# Patient Record
Sex: FEMALE | Race: WHITE
[De-identification: names, ages, dates, MRNs, and addresses within clinical notes are randomized per-mention and may not be internally consistent; named-entity substitution may affect disease eponyms.]

---

## 2020-07-27 ENCOUNTER — HOSPITAL ENCOUNTER (OUTPATIENT)
Dept: HOSPITAL 39 - MAMMO | Age: 60
End: 2020-07-27
Payer: COMMERCIAL

## 2020-07-27 DIAGNOSIS — R92.2: Primary | ICD-10-CM

## 2020-07-27 PROCEDURE — 76641 ULTRASOUND BREAST COMPLETE: CPT

## 2020-07-27 PROCEDURE — 77065 DX MAMMO INCL CAD UNI: CPT

## 2020-07-28 NOTE — US
EXAM DESCRIPTION: 

3D Diagnostic, Left (accession [id number]), Breast,Left

(accession [id number]): Ultrasound



CLINICAL HISTORY: 

60 yearsFemaleABNORMAL MAMMOGRAM left breast retroareolar focal

asymmetry. Previous screening studies at outside imaging

facility. No personal or family history of breast cancer.

Menarche age 14. Childbirth age 18. Menopause age unknown.

Currently on HRT Lifetime risk of developing breast cancer

(Tyrer-Cuzick model)(%):  4.9.



COMPARISON: 

2-D digital screening bilateral mammography October 2009. More

recent screening examinations at outside imaging facility.



TECHNIQUE: 

Left breast LM, CC, and MLO projection full-field images, digital

tomosynthesis technique. Left breast 2-D digital full-field

images:  LM, CC, and MLO projections. CAD available for 2-D

images.. Transcutaneous scanning of the left breast utilizing

gray-scale and Doppler modes. Scanning performed by the

sonographer ; observation by Dr. Zaragoza.



FINDINGS: 

The breast parenchymal density pattern is:  Scattered areas of

fibroglandular density.   No skin thickening or nipple retraction

 vascular and coarse parenchymal calcifications. Axillary lymph

nodes nodes.

No new focal, stellate mass or density, , and no suspicious

microcalcifications retroareolar left breast. 



Ultrasound: Scanning of the retroareolar left breast. Minimal

ducts. No dominant solid mass, no distinct cyst, no fluid

collection, no large calcifications. No overlying skin changes.



IMPRESSION: 

Benign exam.



BIRAD CATEGORY: 2 BENIGN FINDINGS.



RECOMMENDATIONS:

FOLLOW UP: Return to routine digital bilateral  mammographic

screening, one year interval from  July 2020.



Written communication explaining the IMPRESSION and follow-up,

will be mailed to the patient and referring health care provider.

The FINDINGS  and the FOLLOW-UP plan were reviewed in person with

the patient after the examination. Will Compare to prior outside

studies when available



According to the [employer], yearly mammograms

are recommended starting at age 40 and continuing as long as a

woman is in good health.  Any breast change noted on a breast

self-exam should be reported promptly to the patient's healthcare

provider.  Breast MRI is recommended for women with an

approximately 20-25% or greater lifetime risk of breast cancer,

including women with a strong family history of breast or ovarian

cancer and women who have been treated for Hodgkin's disease.



A negative mammographic report should not delay tissue diagnosis

in patients with significant clinical history or physical

findings.



Extremely dense breast tissue limits the sensitivity of digital

mammography. 



Electronically signed by:  Mike Zaragoza MD  7/28/2020 9:55 AM CDT

Workstation: [id number]

## 2020-07-28 NOTE — MAM
EXAM DESCRIPTION: 

3D Diagnostic, Left (accession [id number]), Breast,Left

(accession [id number]): Ultrasound



CLINICAL HISTORY: 

60 yearsFemaleABNORMAL MAMMOGRAM left breast retroareolar focal

asymmetry. Previous screening studies at outside imaging

facility. No personal or family history of breast cancer.

Menarche age 14. Childbirth age 18. Menopause age unknown.

Currently on HRT Lifetime risk of developing breast cancer

(Tyrer-Cuzick model)(%):  4.9.



COMPARISON: 

2-D digital screening bilateral mammography October 2009. More

recent screening examinations at outside imaging facility.



TECHNIQUE: 

Left breast LM, CC, and MLO projection full-field images, digital

tomosynthesis technique. Left breast 2-D digital full-field

images:  LM, CC, and MLO projections. CAD available for 2-D

images.. Transcutaneous scanning of the left breast utilizing

gray-scale and Doppler modes. Scanning performed by the

sonographer ; observation by Dr. Zaragoza.



FINDINGS: 

The breast parenchymal density pattern is:  Scattered areas of

fibroglandular density.   No skin thickening or nipple retraction

 vascular and coarse parenchymal calcifications. Axillary lymph

nodes nodes.

No new focal, stellate mass or density, , and no suspicious

microcalcifications retroareolar left breast. 



Ultrasound: Scanning of the retroareolar left breast. Minimal

ducts. No dominant solid mass, no distinct cyst, no fluid

collection, no large calcifications. No overlying skin changes.



IMPRESSION: 

Benign exam.



BIRAD CATEGORY: 2 BENIGN FINDINGS.



RECOMMENDATIONS:

FOLLOW UP: Return to routine digital bilateral  mammographic

screening, one year interval from  July 2020.



Written communication explaining the IMPRESSION and follow-up,

will be mailed to the patient and referring health care provider.

The FINDINGS  and the FOLLOW-UP plan were reviewed in person with

the patient after the examination. Will Compare to prior outside

studies when available



According to the [employer], yearly mammograms

are recommended starting at age 40 and continuing as long as a

woman is in good health.  Any breast change noted on a breast

self-exam should be reported promptly to the patient's healthcare

provider.  Breast MRI is recommended for women with an

approximately 20-25% or greater lifetime risk of breast cancer,

including women with a strong family history of breast or ovarian

cancer and women who have been treated for Hodgkin's disease.



A negative mammographic report should not delay tissue diagnosis

in patients with significant clinical history or physical

findings.



Extremely dense breast tissue limits the sensitivity of digital

mammography. 



Electronically signed by:  Mike Zaragoza MD  7/28/2020 9:55 AM CDT

Workstation: [id number]